# Patient Record
Sex: MALE
[De-identification: names, ages, dates, MRNs, and addresses within clinical notes are randomized per-mention and may not be internally consistent; named-entity substitution may affect disease eponyms.]

---

## 2018-07-02 ENCOUNTER — HOSPITAL ENCOUNTER (OUTPATIENT)
Dept: HOSPITAL 88 - OR | Age: 63
Discharge: HOME | End: 2018-07-02
Attending: INTERNAL MEDICINE
Payer: COMMERCIAL

## 2018-07-02 DIAGNOSIS — K20.9: ICD-10-CM

## 2018-07-02 DIAGNOSIS — K21.9: ICD-10-CM

## 2018-07-02 DIAGNOSIS — K57.30: ICD-10-CM

## 2018-07-02 DIAGNOSIS — K64.8: ICD-10-CM

## 2018-07-02 DIAGNOSIS — K63.5: ICD-10-CM

## 2018-07-02 DIAGNOSIS — R03.0: ICD-10-CM

## 2018-07-02 DIAGNOSIS — K29.70: Primary | ICD-10-CM

## 2018-07-02 DIAGNOSIS — Z01.810: ICD-10-CM

## 2018-07-02 PROCEDURE — 43239 EGD BIOPSY SINGLE/MULTIPLE: CPT

## 2018-07-02 PROCEDURE — 93005 ELECTROCARDIOGRAM TRACING: CPT

## 2018-07-02 PROCEDURE — 45384 COLONOSCOPY W/LESION REMOVAL: CPT

## 2018-07-02 PROCEDURE — 45378 DIAGNOSTIC COLONOSCOPY: CPT

## 2018-07-02 NOTE — OPERATIVE REPORT
DATE OF PROCEDURE:  July 02, 2018 



REFERRING PHYSICIAN:  Barbara Rashid MD  



PROCEDURES PERFORMED

1. Esophagogastroduodenoscopy with biopsies.  

2. Colonoscopy with polypectomy.  



INDICATIONS FOR EGD:  History of heartburn, melena.  



INDICATION FOR COLONOSCOPY:  Colorectal cancer screening.



MEDICATION:  Patient was done under MAC.  Please see anesthesiologist's 

note.



PROCEDURE:  With the patient in the left lateral decubitus position, the 

flexible fiberoptic Olympus gastroscope was introduced into the esophagus 

under direct visualization without any difficulty.  There was some patchy 

erythema noted in the distal esophagus.  The GE junction was somewhat 

nodular, and biopsies were obtained.  The scope was then advanced with ease 

into the stomach.  The mucosa overlying the antrum and the body revealed 

some patchy erythema and mild to moderate edema, and biopsies were obtained 

and sent to stain for H. pylori.  Pylorus appeared to be of normal contour 

and shape.  It was intubated with ease, and the scope was advanced all the 

way to the 2nd portion of the duodenum.  The scope was then withdrawn 

slowly.  Mucosa overlying the proximal 2nd portion and the duodenal bulb 

appeared to be within normal limits.  The scope was then withdrawn back 

into the stomach and retroflexed, and mucosa overlying the fundus and the 

cardia appeared to be within normal limits.  The scope was then 

straightened out.  The stomach was decompressed.  Scope was subsequently 

withdrawn.  Patient tolerated the procedure well.



IMPRESSION

1. Distal esophagitis.

2. Gastroesophageal junction somewhat nodular, biopsied.

3. Gastritis, biopsied.  Biopsies sent to stain for H. pylori.



PLAN:  Follow up histology.  Initiate Protonix 40 mg 1 p.o. q.a.m. a.c.



The patient was then turned around.  After adequate lubrication of the anal 

canal, a flexible fiberoptic Olympus colonoscope was inserted into the 

rectum with ease and advanced all the way to the cecum.  The mucosa 

overlying the cecum appeared to be within normal limits.    The scope was 

then withdrawn slowly.  One polyp was hot biopsied from the ascending 

colon.  Diverticular disease was noted to be scattered throughout but more 

pronounced in the distal descending and sigmoid colon.  One polyp was hot 

biopsied from the descending colon.  The sigmoid, other than for 

diverticular disease, appeared to be within normal limits.  The scope was 

then retroflexed into the distal rectum, and small internal hemorrhoids 

were noted, none of which was actively bleeding.  The scope was then 

straightened out.  It was subsequently withdrawn.  Patient tolerated the 

procedure well.  



IMPRESSION

1. Ascending colon polyp, hot biopsied.

2. Diverticulosis.

3. Descending colon polyp, hot biopsied.

4. Internal hemorrhoids, none actively bleeding.  



PLAN:  Follow up histology.  Initiate high-fiber, low-fat diet.  Initiate 

high-fiber supplement.  Patient will need a followup colonoscopy in 3 to 5 

years.  







DD:  07/02/2018 09:10

DT:  07/02/2018 09:14

Job#:  [id number] 



cc:BARBARA RASHID MD